# Patient Record
(demographics unavailable — no encounter records)

---

## 2017-02-05 NOTE — GHP
[f rep st]



                                                       PREOP HISTORY AND PHYSICAL





DATE OF ADMISSION:  02/06/2017



HISTORY:  The patient is a 65-year-old female who recently injured her left knee skiing.  She develop
ed pain and swelling.  MRI shows an ACL rupture, as well as bone contusion.  She has done well with a
n ACL reconstruction on the opposite side.  She remains a very active individual who would like to co
ntinue pivoting activity, so she wants to proceed with an ACL reconstruction.



ALLERGIES:  Amoxicillin.



PAST SURGICAL HISTORY:  Includes an ORIF of a distal radius fracture in 2016.  ACL reconstruction wit
h allograft in 2012 right knee.



PAST MEDICAL HISTORY:  She has an elevation of cholesterol.  Mild hypertension.



MEDICATIONS:  Include losartan 25 mg p.o. daily.  She uses Estrace cream.



REVIEW OF SYSTEMS:  Negative for cardiopulmonary disease.



PHYSICAL EXAMINATION:  GENERAL:  The patient is a well-developed, well-nourished female in no apparen
t distress.  HEAD/NECK:  Normocephalic, atraumatic.  CHEST:  Clear.  CARDIOVASCULAR:  Regular rate an
d rhythm.  ABDOMEN:  Soft.  NEUROLOGIC:  Alert and oriented x3.  EXTREMITIES:  Left knee showed a pos
itive Lachman's, positive anterior drawer.  Collaterals appear stable, so does her PCL.  She has a sm
all effusion.  NEUROVASCULAR:  Intact.  SKIN:  Intact.  Minimal medial and lateral joint line tendern
ess.



IMPRESSION:  Left knee anterior cruciate ligament rupture, bone contusions, possible medial meniscal 
tear.



PLAN:  Left knee arthroscopy, cartilage work as needed, and allograft ACL reconstruction.  Benefits a
nd risks of surgery have been reviewed.  She has signed a consent form and wishes to proceed.





Job #:  [id number]

## 2025-04-21 NOTE — GOP
[f 
rep st]



                                                                OPERATIVE REPORT





DATE OF OPERATION:  02/06/2017



SURGEON:  Alex Morris MD



ASSISTANT:  Neftaly Cannon [facility]



ANESTHESIOLOGIST:  Chucky Meraz MD



PREOPERATIVE DIAGNOSIS:  Left knee anterior cruciate ligament rupture, lateral 
compartment bone contusions, possible medial meniscal tear.



POSTOPERATIVE DIAGNOSIS:  Left knee anterior cruciate ligament rupture, lateral 
compartment bone contusion, medial meniscal tear.



PROCEDURE PERFORMED:  Left knee arthroscopy, partial medial meniscectomy, 
anterior cruciate ligament reconstruction with patellar tendon allograft.



FINDINGS:  

1. Exam under anesthesia demonstrates a positive Lachman's, positive pivot shift
, no posterior sag, collateral ligament stable.  

2. On arthroscopy, the patellofemoral articulation has minimal chondromalacia.  
In the notch, the ACL is torn, largely from the femoral attachment.  PCL 
intact.  In the medial compartment, there was a complex meniscal tear involving 
the posterior horn and midbody.  It involves about the inner 1/2 of the 
substance of the meniscus with an undersurface flap.  Minimal chondromalacia in 
the medial compartment.  Lateral compartment had intact articular meniscal 
cartilage.



INDICATIONS:  Haleigh is a 65-year-old female who injured her left knee skiing.  
She presents with history, exam and MRI consistent with an ACL rupture.  She is 
still very active and participates in pivoting activities and has elected to 
undergo an ACL reconstruction.



DESCRIPTION OF PROCEDURE:  The patient was taken to the operating room and 
adductor femoral nerve block was placed by Dr. Meraz under ultrasound 
guidance.  She was then placed under general anesthetic with laryngeal mask 
ventilation.  She received 2 g of IV Ancef.  A tourniquet was fit high on the 
left thigh.  The left thigh was put in a leg singleton.  The knee was prepped and 
draped in usual fashion.  Standard arthroscopy portals were used.  



The entire knee was inspected with the above-noted findings.  I cleaned up the 
medial meniscus with a basket punch and a rotary shaver, doing a partial medial 
meniscectomy, ellipsing the meniscus back to a stable, smooth rim.  I did a 
notch plasty enlarging the lateral wall and roof of the notch until the over-the
-top position was clearly visualized.  With my assistant's help, I prepared an 
excellent quality patellar tendon allograft, a 10 mm thick segment of tendon, 
with 10 mm bone plugs, and sutures were placed in the bone plugs.  I used a 3M 
guide at 75 degrees and a small incision medial to the tibial tubercle to pass 
a guide pin, then a 10 mm reamer, to make a tibial tunnel.  I used the guide at 
120 degrees and a small distal lateral femoral incision, and the guide pin was 
placed, emerging immediately ahead of the over-the-top position at the junction 
of the lateral wall and roof of the notch and reamed to 10 mm as well.  The 
graft was pulled through the tibia into the femur, secured proximally with an 8 
x 23 cannulated screw with good purchase.  Distally, since metaphyseal bone was 
soft, I used a screw post fixation, I pre-drilled and placed a single 4.5 self-
tapping cortical AO screw with a washer, and tied the sutures through the 
tibial bone plug around this with multiple knots.  The graft had appropriate 
tension, nicely cleared the lateral wall and roof, allowed full extension, 
negative Lachman's test.  The joint was irrigated.  I placed 15 cc of 0.5% 
plain Marcaine and 5 mg of Duramorph in the joint.  I infiltrated 10 cc of 0.5% 
plain Marcaine around the lateral incision.  IT band was closed with 2-0 PDS, 
subcutaneous tissue and the 2 small wounds with 3-0 Monocryl, skin with 4-0 
Prolene.  The wounds were dressed with Betadine-soaked Adaptic, 4 x 4, sterile 
Webril, and a long-leg Nadir stocking.  



No complications.  Estimated blood loss was minimal.  No drains or specimens.  
All counts were correct.  The patient was taken in stable condition to 
recovery.  



My surgical assistant was a medical necessity for leg positioning, soft tissue 
traction, preparing the graft, and saved operative time.





Job #:  [id number]

MTDD [phone]